# Patient Record
Sex: FEMALE | Race: WHITE | Employment: STUDENT | URBAN - METROPOLITAN AREA
[De-identification: names, ages, dates, MRNs, and addresses within clinical notes are randomized per-mention and may not be internally consistent; named-entity substitution may affect disease eponyms.]

---

## 2017-01-23 ENCOUNTER — TRANSFERRED RECORDS (OUTPATIENT)
Dept: HEALTH INFORMATION MANAGEMENT | Facility: CLINIC | Age: 27
End: 2017-01-23

## 2017-02-13 ENCOUNTER — TRANSFERRED RECORDS (OUTPATIENT)
Dept: HEALTH INFORMATION MANAGEMENT | Facility: CLINIC | Age: 27
End: 2017-02-13

## 2017-08-14 ENCOUNTER — TRANSFERRED RECORDS (OUTPATIENT)
Dept: HEALTH INFORMATION MANAGEMENT | Facility: CLINIC | Age: 27
End: 2017-08-14

## 2017-08-31 ENCOUNTER — TRANSFERRED RECORDS (OUTPATIENT)
Dept: HEALTH INFORMATION MANAGEMENT | Facility: CLINIC | Age: 27
End: 2017-08-31

## 2018-04-27 ENCOUNTER — OFFICE VISIT (OUTPATIENT)
Dept: FAMILY MEDICINE | Facility: CLINIC | Age: 28
End: 2018-04-27
Payer: COMMERCIAL

## 2018-04-27 VITALS
HEIGHT: 69 IN | TEMPERATURE: 97.6 F | OXYGEN SATURATION: 100 % | HEART RATE: 73 BPM | DIASTOLIC BLOOD PRESSURE: 82 MMHG | BODY MASS INDEX: 23.4 KG/M2 | SYSTOLIC BLOOD PRESSURE: 116 MMHG | RESPIRATION RATE: 14 BRPM | WEIGHT: 158 LBS

## 2018-04-27 DIAGNOSIS — L70.9 ACNE, UNSPECIFIED ACNE TYPE: ICD-10-CM

## 2018-04-27 DIAGNOSIS — R22.32 LUMP OF AXILLA, LEFT: ICD-10-CM

## 2018-04-27 DIAGNOSIS — R59.1 LYMPHADENOPATHY: ICD-10-CM

## 2018-04-27 DIAGNOSIS — Z00.00 ENCOUNTER FOR ROUTINE ADULT HEALTH EXAMINATION WITHOUT ABNORMAL FINDINGS: Primary | ICD-10-CM

## 2018-04-27 PROCEDURE — 99213 OFFICE O/P EST LOW 20 MIN: CPT | Mod: 25 | Performed by: FAMILY MEDICINE

## 2018-04-27 PROCEDURE — G0145 SCR C/V CYTO,THINLAYER,RESCR: HCPCS | Performed by: FAMILY MEDICINE

## 2018-04-27 PROCEDURE — 99385 PREV VISIT NEW AGE 18-39: CPT | Performed by: FAMILY MEDICINE

## 2018-04-27 RX ORDER — DROSPIRENONE AND ETHINYL ESTRADIOL 0.03MG-3MG
1 KIT ORAL DAILY
Qty: 30 TABLET | Status: CANCELLED | OUTPATIENT
Start: 2018-04-27

## 2018-04-27 RX ORDER — DESOGESTREL AND ETHINYL ESTRADIOL 0.15-0.03
1 KIT ORAL DAILY
Qty: 28 TABLET | Refills: 11 | Status: SHIPPED | OUTPATIENT
Start: 2018-04-27 | End: 2019-04-14

## 2018-04-27 NOTE — PROGRESS NOTES
SUBJECTIVE:   CC: Zahida Robbins is an 27 year old woman who presents for preventive health visit.     Physical   Annual:     Getting at least 3 servings of Calcium per day::  Yes    Bi-annual eye exam::  NO    Dental care twice a year::  Yes    Sleep apnea or symptoms of sleep apnea::  None    Diet::  Regular (no restrictions)    Frequency of exercise::  2-3 days/week    Duration of exercise::  15-30 minutes    Taking medications regularly::  Yes    Medication side effects::  None    Additional concerns today::  No            [facility] in [place], [place] were faxed over  1) acne, seems to be getting more pimples now , she does have oily skin and in the past has used tazorac and that has helped with the acne , so wondering if she can restart this now  2) lump under the left axilla , she has had it in the past and in the previous clinic has had it assessed and determined to be benign but to monitor it , she does not have those records with her          Today's PHQ-2 Score: No flowsheet data found.    Abuse: Current or Past(Physical, Sexual or Emotional)- No  Do you feel safe in your environment - Yes    Social History   Substance Use Topics     Smoking status: Not on file     Smokeless tobacco: Not on file     Alcohol use Not on file     No flowsheet data found.No flowsheet data found.    Reviewed orders with patient.  Reviewed health maintenance and updated orders accordingly - Yes  Labs reviewed in EPIC  BP Readings from Last 3 Encounters:   04/27/18 116/82   12/06/15 115/73    Wt Readings from Last 3 Encounters:   04/27/18 158 lb (71.7 kg)                  Patient Active Problem List   Diagnosis     Acne, unspecified acne type     Lymphadenopathy     History reviewed. No pertinent surgical history.    Social History   Substance Use Topics     Smoking status: Never Smoker     Smokeless tobacco: Never Used     Alcohol use Yes      Comment: 2x week     Family History   Problem Relation Age of Onset      Asthma Mother      CEREBROVASCULAR DISEASE Maternal Grandmother      Depression Maternal Grandfather      Heart Failure Paternal Grandmother          Current Outpatient Prescriptions   Medication Sig Dispense Refill     desogestrel-ethinyl estradiol (APRI) 0.15-30 MG-MCG per tablet Take 1 tablet by mouth daily 28 tablet 11     drospirenone-ethinyl estradiol (BENJI) 3-0.03 MG per tablet Take 1 tablet by mouth daily       multivitamin, therapeutic with minerals (MULTI-VITAMIN) TABS Take 1 tablet by mouth daily       tazarotene (TAZORAC) 0.05 % CREA cream Externally apply topically daily 1 Tube 4     ISOtretinoin (ACCUTANE PO)          Mammogram not appropriate for this patient based on age.    Pertinent mammograms are reviewed under the imaging tab.  History of abnormal Pap smear: NO - age 21-29 PAP every 3 years recommended    Reviewed and updated as needed this visit by clinical staff  Allergies  Meds         Reviewed and updated as needed this visit by Provider        History reviewed. No pertinent past medical history.   History reviewed. No pertinent surgical history.    Review of Systems  CONSTITUTIONAL: NEGATIVE for fever, chills, change in weight  INTEGUMENTARU/SKIN: NEGATIVE for worrisome rashes, moles or lesions  EYES: NEGATIVE for vision changes or irritation  ENT: NEGATIVE for ear, mouth and throat problems  RESP: NEGATIVE for significant cough or SOB  BREAST: NEGATIVE for masses, tenderness or discharge  CV: NEGATIVE for chest pain, palpitations or peripheral edema  GI: NEGATIVE for nausea, abdominal pain, heartburn, or change in bowel habits  : NEGATIVE for unusual urinary or vaginal symptoms. Periods are regular.  MUSCULOSKELETAL: NEGATIVE for significant arthralgias or myalgia  NEURO: NEGATIVE for weakness, dizziness or paresthesias  PSYCHIATRIC: NEGATIVE for changes in mood or affect     OBJECTIVE:   There were no vitals taken for this visit.  Physical Exam  GENERAL: healthy, alert and no  distress  EYES: Eyes grossly normal to inspection, PERRL and conjunctivae and sclerae normal  HENT: ear canals and TM's normal, nose and mouth without ulcers or lesions  NECK: no adenopathy, no asymmetry, masses, or scars and thyroid normal to palpation  RESP: lungs clear to auscultation - no rales, rhonchi or wheezes  BREAST: normal without masses, tenderness or nipple discharge and no palpable axillary masses or adenopathy  There is a small ( about 1cm in size)  lymph node which is soft non tender and mobile in the left axillary area , no skin changes and no breast lumps palpable .  CV: regular rate and rhythm, normal S1 S2, no S3 or S4, no murmur, click or rub, no peripheral edema and peripheral pulses strong  ABDOMEN: soft, nontender, no hepatosplenomegaly, no masses and bowel sounds normal   (female): normal female external genitalia, normal urethral meatus, vaginal mucosa pink, moist, well rugated, and normal cervix/adnexa/uterus without masses or discharge  MS: no gross musculoskeletal defects noted, no edema  SKIN: no suspicious lesions or rashes  NEURO: Normal strength and tone, mentation intact and speech normal  PSYCH: mentation appears normal, affect normal/bright    ASSESSMENT/PLAN:   1. Encounter for routine adult health examination without abnormal findings  Discussed diet,calcium,exercise.Went over self breast exam.Thin prep was done.Eyes and teeth UTD.No immunizations needed today.See orders below for tests ordered and screening needed.    - Pap imaged thin layer screen reflex to HPV if ASCUS - recommend age 25 - 29  - desogestrel-ethinyl estradiol (APRI) 0.15-30 MG-MCG per tablet; Take 1 tablet by mouth daily  Dispense: 28 tablet; Refill: 11    2. Acne, unspecified acne type  She has used tazorac in the past with good results and wondering if she re start it again . rx sent .  - tazarotene (TAZORAC) 0.05 % CREA cream; Externally apply topically daily  Dispense: 1 Tube; Refill: 4    3.  Lymphadenopathysee below   - MA Diagnostic Digital Bilateral; Future    4. Lump of right breast  Will do a mammogram diagnostic because of the lump in the left axilla and also an US of that breast as well . F/u as needed   - MA Diagnostic Digital Bilateral; Future    COUNSELING:  Reviewed preventive health counseling, as reflected in patient instructions       Regular exercise       Healthy diet/nutrition       Vision screening       Contraception       Family planning       Folic Acid Counseling         has no tobacco history on file.    There is no height or weight on file to calculate BMI.       Counseling Resources:  ATP IV Guidelines  Pooled Cohorts Equation Calculator  Breast Cancer Risk Calculator  FRAX Risk Assessment  ICSI Preventive Guidelines  Dietary Guidelines for Americans, 2010  USDA's MyPlate  ASA Prophylaxis  Lung CA Screening    Marietta Law MD  [facility]  Answers for HPI/ROS submitted by the patient on 4/27/2018   PHQ-2 Score: 0

## 2018-04-27 NOTE — MR AVS SNAPSHOT
After Visit Summary   4/27/2018    Zahida Robbins    MRN: [id number]           Patient Information     Date Of Birth          1990        Visit Information        Provider Department      4/27/2018 3:15 PM Marietta Law MD [facility]        Today's Diagnoses     Encounter for routine adult health examination without abnormal findings    -  1    Acne, unspecified acne type        Lymphadenopathy        Lump of right breast          Care Instructions      Preventive Health Recommendations  Female Ages 26 - 39  Yearly exam:   See your health care provider every year in order to    Review health changes.     Discuss preventive care.      Review your medicines if you your doctor has prescribed any.    Until age 30: Get a Pap test every three years (more often if you have had an abnormal result).    After age 30: Talk to your doctor about whether you should have a Pap test every 3 years or have a Pap test with HPV screening every 5 years.   You do not need a Pap test if your uterus was removed (hysterectomy) and you have not had cancer.  You should be tested each year for STDs (sexually transmitted diseases), if you're at risk.   Talk to your provider about how often to have your cholesterol checked.  If you are at risk for diabetes, you should have a diabetes test (fasting glucose).  Shots: Get a flu shot each year. Get a tetanus shot every 10 years.   Nutrition:     Eat at least 5 servings of fruits and vegetables each day.    Eat whole-grain bread, whole-wheat pasta and brown rice instead of white grains and rice.    Talk to your provider about Calcium and Vitamin D.     Lifestyle    Exercise at least 150 minutes a week (30 minutes a day, 5 days of the week). This will help you control your weight and prevent disease.    Limit alcohol to one drink per day.    No smoking.     Wear sunscreen to prevent skin cancer.    See your dentist every six months for an exam and cleaning.          "   Follow-ups after your visit        Future tests that were ordered for you today     Open Future Orders        Priority Expected Expires Ordered    MA Diagnostic Digital Bilateral Routine  2019            Who to contact     If you have questions or need follow up information about today's clinic visit or your schedule please contact [facility] directly at [phone].  Normal or non-critical lab and imaging results will be communicated to you by MyChart, letter or phone within 4 business days after the clinic has received the results. If you do not hear from us within 7 days, please contact the clinic through [employer] or phone. If you have a critical or abnormal lab result, we will notify you by phone as soon as possible.  Submit refill requests through [employer] or call your pharmacy and they will forward the refill request to us. Please allow 3 business days for your refill to be completed.          Additional Information About Your Visit        [employer] Information     [employer] lets you send messages to your doctor, view your test results, renew your prescriptions, schedule appointments and more. To sign up, go to [email or web address] . Click on \"Log in\" on the left side of the screen, which will take you to the Welcome page. Then click on \"Sign up Now\" on the right side of the page.     You will be asked to enter the access code listed below, as well as some personal information. Please follow the directions to create your username and password.     Your access code is: [email or web address]  Expires: 2018  3:52 PM     Your access code will  in 90 days. If you need help or a new code, please call your [place] clinic or [phone].        Care EveryWhere ID     This is your Care EveryWhere ID. This could be used by other organizations to access your [place] medical records  [id number]        Your Vitals Were     Pulse Temperature Respirations Height Pulse Oximetry " "Breastfeeding?    73 97.6  F (36.4  C) (Tympanic) 14 5' 9\" (1.753 m) 100% No    BMI (Body Mass Index)                   23.33 kg/m2            Blood Pressure from Last 3 Encounters:   04/27/18 116/82   12/06/15 115/73    Weight from Last 3 Encounters:   04/27/18 158 lb (71.7 kg)              We Performed the Following     Pap imaged thin layer screen reflex to HPV if ASCUS - recommend age 25 - 29          Today's Medication Changes          These changes are accurate as of 4/27/18  3:53 PM.  If you have any questions, ask your nurse or doctor.               Start taking these medicines.        Dose/Directions    desogestrel-ethinyl estradiol 0.15-30 MG-MCG per tablet   Commonly known as:  APRI   Used for:  Encounter for routine adult health examination without abnormal findings   Started by:  Marietta Law MD        Dose:  1 tablet   Take 1 tablet by mouth daily   Quantity:  28 tablet   Refills:  11       tazarotene 0.05 % Crea cream   Commonly known as:  TAZORAC   Used for:  Acne, unspecified acne type   Started by:  Marietta Law MD        Externally apply topically daily   Quantity:  1 Tube   Refills:  4            Where to get your medicines      These medications were sent to [address] [facility]  [address] [facility] [address]     Phone:  [phone]     desogestrel-ethinyl estradiol 0.15-30 MG-MCG per tablet    tazarotene 0.05 % Crea cream                Primary Care Provider Office Phone # Fax #    Jimi Hernández [phone]       [address] [address]        Equal Access to Services     RICK MILLS AH: Hadii jeannie dinero Sojorge alberto, waaxda luqadaha, qaybta kaalmada aderonald, cosme bernal. So [facility] [phone].    ATENCIÓN: Si habla español, tiene a carvajal disposición servicios gratuitos de asistencia lingüística. Llame al [phone].    We comply with applicable federal civil " rights laws and [place] laws. We do not discriminate on the basis of race, color, national origin, age, disability, sex, sexual orientation, or gender identity.            Thank you!     Thank you for choosing [facility]  for your care. Our goal is always to provide you with excellent care. Hearing back from our patients is one way we can continue to improve our services. Please take a few minutes to complete the written survey that you may receive in the mail after your visit with us. Thank you!             Your Updated Medication List - Protect others around you: Learn how to safely use, store and throw away your medicines at www.disposemymeds.org.          This list is accurate as of 4/27/18  3:53 PM.  Always use your most recent med list.                   Brand Name Dispense Instructions for use Diagnosis    ACCUTANE PO           desogestrel-ethinyl estradiol 0.15-30 MG-MCG per tablet    APRI    28 tablet    Take 1 tablet by mouth daily    Encounter for routine adult health examination without abnormal findings       drospirenone-ethinyl estradiol 3-0.03 MG per tablet    BENJI     Take 1 tablet by mouth daily        Multi-vitamin Tabs tablet      Take 1 tablet by mouth daily        tazarotene 0.05 % Crea cream    TAZORAC    1 Tube    Externally apply topically daily    Acne, unspecified acne type

## 2018-04-27 NOTE — LETTER
May 9, 2018      Zahida Robbins  [address] [place] [address]    Dear MsFeliciaItzel,      I am happy to inform you that your recent cervical cancer screening test (PAP smear) was normal.      Preventative screenings such as this help to ensure your health for years to come. You should repeat a pap smear in 3 years, unless otherwise directed.      You will still need to return to the clinic every year for your annual exam and other preventive tests.     Please contact the clinic at [phone] if you have further questions.       Sincerely,      Marietta Law MD/[facility]

## 2018-04-27 NOTE — NURSING NOTE
Chief Complaint   Patient presents with     Physical     pap due       Initial There were no vitals taken for this visit. There is no height or weight on file to calculate BMI.  BP completed using cuff size: regular    Health Maintenance that is potentially due pending provider review:  Pap Smear    PAP--Possibly completing today, per Provider review    Records from [facility] in [place], [place] were faxed over

## 2018-05-01 LAB
COPATH REPORT: NORMAL
PAP: NORMAL

## 2018-05-08 ENCOUNTER — TELEPHONE (OUTPATIENT)
Dept: FAMILY MEDICINE | Facility: CLINIC | Age: 28
End: 2018-05-08

## 2019-03-12 ENCOUNTER — TELEPHONE (OUTPATIENT)
Dept: FAMILY MEDICINE | Facility: CLINIC | Age: 29
End: 2019-03-12

## 2019-03-12 NOTE — TELEPHONE ENCOUNTER
Patient can get her mammogram outside of [place] but she will have to use her [employer] benefits or pay out of pocket. Her plan is [place],[facility] or [facility]. She need to have this done within [place] her plan does not require referrals for payment. So bottom line is [place] is her network that is where she needs to receive care or pay out of pocket. Not sure why she has this plan if she didn't plan to stay in-network! Thanks    Sara Beatty  Referral Cooridnator

## 2019-03-12 NOTE — TELEPHONE ENCOUNTER
Sara,   See below   Pt wanting to get mammogram outside of [place]  Order still in place from 4/27/2018  Please advise if pt ok to go there  Thanks,  Mary CASTILLO RN

## 2019-03-12 NOTE — TELEPHONE ENCOUNTER
Reason for Call: Request for an order or referral: Pt called in about getting orders for an ultrasound.  Last April 20th, pt discussed a lump in right arm pit with Dr. Law, did not look into further with Dr. Law.  Lump is getting bigger and would like to get an ultrasound, which was discussed but never scheduled.     Order or referral being requested: Ultrasound   Date needed: as soon as possible    Has the patient been seen by the PCP for this problem? NO    Additional comments: [place] [facility] [place]      Phone number Patient can be reached at:  Home number on file [phone] (home)    Best Time:  Call after 4:00 pm    Can we leave a detailed message on this number?  YES    Call taken on 3/12/2019 at 12:38 PM by Suhail Medrano

## 2019-03-12 NOTE — TELEPHONE ENCOUNTER
LS,   Patient requesting orders for lump to (R) axilla  Has mammogram order from 2018 that hasn't   Wants to know if you want mammogram though or just US of affected side  States she has no symptoms to left breast  Previous US back in 2017 was just an US, not mammogram  Pt will do whatever you advise but wants to know if just US ok?  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

## 2019-03-20 ENCOUNTER — ANCILLARY PROCEDURE (OUTPATIENT)
Dept: MAMMOGRAPHY | Facility: CLINIC | Age: 29
End: 2019-03-20
Payer: COMMERCIAL

## 2019-03-20 DIAGNOSIS — Z09 FOLLOW UP: ICD-10-CM

## 2019-03-20 DIAGNOSIS — R59.1 LYMPHADENOPATHY: ICD-10-CM

## 2019-03-22 ENCOUNTER — ANCILLARY PROCEDURE (OUTPATIENT)
Dept: MAMMOGRAPHY | Facility: CLINIC | Age: 29
End: 2019-03-22
Attending: FAMILY MEDICINE
Payer: COMMERCIAL

## 2019-03-22 DIAGNOSIS — R59.1 LYMPHADENOPATHY: ICD-10-CM

## 2019-03-22 DIAGNOSIS — Z09 FOLLOW UP: ICD-10-CM

## 2019-03-22 RX ORDER — LIDOCAINE HYDROCHLORIDE 10 MG/ML
10 INJECTION, SOLUTION EPIDURAL; INFILTRATION; INTRACAUDAL; PERINEURAL ONCE
Status: COMPLETED | OUTPATIENT
Start: 2019-03-22 | End: 2019-03-22

## 2019-03-22 RX ADMIN — LIDOCAINE HYDROCHLORIDE 10 ML: 10 INJECTION, SOLUTION EPIDURAL; INFILTRATION; INTRACAUDAL; PERINEURAL at 11:30

## 2019-03-25 ENCOUNTER — TELEPHONE (OUTPATIENT)
Dept: MAMMOGRAPHY | Facility: CLINIC | Age: 29
End: 2019-03-25

## 2019-03-25 LAB — COPATH REPORT: NORMAL

## 2019-03-25 NOTE — TELEPHONE ENCOUNTER
Left a message for Zahida regarding the availability of her axillary biopsy results.  Awaiting return phone call.

## 2019-03-25 NOTE — TELEPHONE ENCOUNTER
Spoke to Zahida about the benign findings from her axillary biopsy done last week. We discussed the Radiologist's recommendation of clinical follow up if she notices and changes in the area and to start her screening mammograms at age 41 unless other symptoms arise.  Zahida verbalized understanding and all questions and concerns were answered at this time.

## 2019-04-14 DIAGNOSIS — Z00.00 ENCOUNTER FOR ROUTINE ADULT HEALTH EXAMINATION WITHOUT ABNORMAL FINDINGS: ICD-10-CM

## 2019-04-15 RX ORDER — DESOGESTREL AND ETHINYL ESTRADIOL 0.15-0.03
KIT ORAL
Qty: 28 TABLET | Refills: 0 | Status: SHIPPED | OUTPATIENT
Start: 2019-04-15 | End: 2019-05-10

## 2019-04-15 NOTE — TELEPHONE ENCOUNTER
"Medication is being filled for 1 time refill only due to:  Patient needs to be seen because it has been more than one year since last visit.   Due for physical.  Last 4/27/18  Racquel Casas RN    Requested Prescriptions   Signed Prescriptions Disp Refills    APRI 0.15-30 MG-MCG tablet 28 tablet 0     Sig: TAKE 1 TABLET BY MOUTH EVERY DAY       Contraceptives Protocol Passed - 4/14/2019  8:30 AM        Passed - Patient is not a current smoker if age is 35 or older        Passed - Recent (12 mo) or future (30 days) visit within the authorizing provider's specialty     Patient had office visit in the last 12 months or has a visit in the next 30 days with authorizing provider or within the authorizing provider's specialty.  See \"Patient Info\" tab in inbasket, or \"Choose Columns\" in Meds & Orders section of the refill encounter.              Passed - Medication is active on med list        Passed - No active pregnancy on record        Passed - No positive pregnancy test in past 12 months            "

## 2019-05-10 DIAGNOSIS — Z00.00 ENCOUNTER FOR ROUTINE ADULT HEALTH EXAMINATION WITHOUT ABNORMAL FINDINGS: ICD-10-CM

## 2019-05-10 RX ORDER — DESOGESTREL AND ETHINYL ESTRADIOL 0.15-0.03
1 KIT ORAL DAILY
Qty: 28 TABLET | Refills: 0 | Status: SHIPPED | OUTPATIENT
Start: 2019-05-10 | End: 2019-06-10

## 2019-05-10 NOTE — TELEPHONE ENCOUNTER
LS,  Routing refill request to provider for review/approval because:  Starla given x1 and patient did not follow up, please advise    Sent [employer] message and added ion pharm comments.  Please authorize if appropriate.  Thanks,  Lynsey Castro RN

## 2019-06-10 DIAGNOSIS — Z00.00 ENCOUNTER FOR ROUTINE ADULT HEALTH EXAMINATION WITHOUT ABNORMAL FINDINGS: ICD-10-CM

## 2019-06-10 NOTE — TELEPHONE ENCOUNTER
One month supply sent 5/10/19  Due for physical.  Last 4/27/18    [employer] message sent to patient asking her to schedule her physical.  Racquel Casas RN

## 2019-06-10 NOTE — TELEPHONE ENCOUNTER
"Requested Prescriptions   Pending Prescriptions Disp Refills     APRI 0.15-30 MG-MCG tablet [Pharmacy Med Name: APRI 28 DAY TABLET] 28 tablet 0     Sig: TAKE 1 TABLET BY MOUTH EVERY DAY **DUE FOR ANNUAL PHYSICAL**       Contraceptives Protocol Failed - 6/10/2019  1:49 AM        Failed - Recent (12 mo) or future (30 days) visit within the authorizing provider's specialty     Patient had office visit in the last 12 months or has a visit in the next 30 days with authorizing provider or within the authorizing provider's specialty.  See \"Patient Info\" tab in inbasket, or \"Choose Columns\" in Meds & Orders section of the refill encounter.              Passed - Patient is not a current smoker if age is 35 or older        Passed - Medication is active on med list        Passed - No active pregnancy on record        Passed - No positive pregnancy test in past 12 months        Last Written Prescription Date:  5/10/2019  Last Fill Quantity: 1 month,  # refills: 0   Last office visit: 4/27/2018 with prescribing provider:  Dr. Law  Future Office Visit:      Comfort Correa MA  Medical Assistant  [facility]          "

## 2019-06-13 NOTE — TELEPHONE ENCOUNTER
Patient has not read [email or web address]  Left non detailed VM for pt asking that they callback and schedule physical  Mary CASTILLO RN

## 2019-06-14 RX ORDER — DESOGESTREL AND ETHINYL ESTRADIOL 0.15-0.03
KIT ORAL
Qty: 28 TABLET | Refills: 0 | Status: SHIPPED | OUTPATIENT
Start: 2019-06-14

## 2019-06-14 NOTE — TELEPHONE ENCOUNTER
LS,   No response from pt  See below messages  Please advise on further refills  Thanks,  Mary CASTILLO RN

## 2019-11-03 ENCOUNTER — HEALTH MAINTENANCE LETTER (OUTPATIENT)
Age: 29
End: 2019-11-03

## 2020-11-16 ENCOUNTER — HEALTH MAINTENANCE LETTER (OUTPATIENT)
Age: 30
End: 2020-11-16

## 2021-09-18 ENCOUNTER — HEALTH MAINTENANCE LETTER (OUTPATIENT)
Age: 31
End: 2021-09-18

## 2022-01-08 ENCOUNTER — HEALTH MAINTENANCE LETTER (OUTPATIENT)
Age: 32
End: 2022-01-08

## 2022-11-19 ENCOUNTER — HEALTH MAINTENANCE LETTER (OUTPATIENT)
Age: 32
End: 2022-11-19

## 2023-04-09 ENCOUNTER — HEALTH MAINTENANCE LETTER (OUTPATIENT)
Age: 33
End: 2023-04-09